# Patient Record
Sex: MALE | Race: WHITE | HISPANIC OR LATINO | Employment: FULL TIME | URBAN - METROPOLITAN AREA
[De-identification: names, ages, dates, MRNs, and addresses within clinical notes are randomized per-mention and may not be internally consistent; named-entity substitution may affect disease eponyms.]

---

## 2023-06-23 ENCOUNTER — APPOINTMENT (OUTPATIENT)
Dept: CT IMAGING | Facility: CLINIC | Age: 58
End: 2023-06-23
Attending: EMERGENCY MEDICINE
Payer: OTHER MISCELLANEOUS

## 2023-06-23 ENCOUNTER — HOSPITAL ENCOUNTER (EMERGENCY)
Facility: CLINIC | Age: 58
Discharge: HOME | End: 2023-06-23
Attending: EMERGENCY MEDICINE | Admitting: EMERGENCY MEDICINE
Payer: OTHER MISCELLANEOUS

## 2023-06-23 ENCOUNTER — APPOINTMENT (OUTPATIENT)
Dept: GENERAL RADIOLOGY | Facility: CLINIC | Age: 58
End: 2023-06-23
Attending: EMERGENCY MEDICINE
Payer: OTHER MISCELLANEOUS

## 2023-06-23 VITALS
HEART RATE: 62 BPM | BODY MASS INDEX: 31.83 KG/M2 | SYSTOLIC BLOOD PRESSURE: 110 MMHG | WEIGHT: 235 LBS | OXYGEN SATURATION: 96 % | HEIGHT: 72 IN | RESPIRATION RATE: 16 BRPM | TEMPERATURE: 97.3 F | DIASTOLIC BLOOD PRESSURE: 64 MMHG

## 2023-06-23 DIAGNOSIS — S40.012A CONTUSION OF LEFT SHOULDER, INITIAL ENCOUNTER: ICD-10-CM

## 2023-06-23 DIAGNOSIS — S22.32XA CLOSED FRACTURE OF ONE RIB OF LEFT SIDE, INITIAL ENCOUNTER: ICD-10-CM

## 2023-06-23 DIAGNOSIS — S32.009A CLOSED FRACTURE OF TRANSVERSE PROCESS OF LUMBAR VERTEBRA, INITIAL ENCOUNTER (H): ICD-10-CM

## 2023-06-23 PROCEDURE — 73030 X-RAY EXAM OF SHOULDER: CPT | Mod: LT

## 2023-06-23 PROCEDURE — 72131 CT LUMBAR SPINE W/O DYE: CPT

## 2023-06-23 PROCEDURE — 72192 CT PELVIS W/O DYE: CPT

## 2023-06-23 PROCEDURE — 70450 CT HEAD/BRAIN W/O DYE: CPT

## 2023-06-23 PROCEDURE — 250N000013 HC RX MED GY IP 250 OP 250 PS 637: Performed by: EMERGENCY MEDICINE

## 2023-06-23 PROCEDURE — 99285 EMERGENCY DEPT VISIT HI MDM: CPT | Mod: 25

## 2023-06-23 RX ORDER — OXYCODONE AND ACETAMINOPHEN 5; 325 MG/1; MG/1
2 TABLET ORAL ONCE
Status: COMPLETED | OUTPATIENT
Start: 2023-06-23 | End: 2023-06-23

## 2023-06-23 RX ORDER — CYCLOBENZAPRINE HCL 10 MG
10 TABLET ORAL ONCE
Status: COMPLETED | OUTPATIENT
Start: 2023-06-23 | End: 2023-06-23

## 2023-06-23 RX ORDER — OXYCODONE AND ACETAMINOPHEN 5; 325 MG/1; MG/1
1 TABLET ORAL EVERY 6 HOURS PRN
Qty: 12 TABLET | Refills: 0 | Status: SHIPPED | OUTPATIENT
Start: 2023-06-23 | End: 2023-06-26

## 2023-06-23 RX ORDER — CYCLOBENZAPRINE HCL 10 MG
5-10 TABLET ORAL 3 TIMES DAILY PRN
Qty: 20 TABLET | Refills: 0 | Status: SHIPPED | OUTPATIENT
Start: 2023-06-23

## 2023-06-23 RX ADMIN — CYCLOBENZAPRINE 10 MG: 10 TABLET, FILM COATED ORAL at 11:51

## 2023-06-23 RX ADMIN — OXYCODONE HYDROCHLORIDE AND ACETAMINOPHEN 2 TABLET: 5; 325 TABLET ORAL at 11:51

## 2023-06-23 ASSESSMENT — ACTIVITIES OF DAILY LIVING (ADL): ADLS_ACUITY_SCORE: 35

## 2023-06-23 NOTE — ED TRIAGE NOTES
Fall - pt at work tripped over some equipment landing on back on tire dami margot - pt complaining of lower back and left shoulder pain - pt hit head no LOC denies neck pain        Triage Assessment     Row Name 06/23/23 105       Triage Assessment (Adult)    Airway WDL WDL       Respiratory WDL    Respiratory WDL WDL       Cardiac WDL    Cardiac WDL WDL       Peripheral/Neurovascular WDL    Peripheral Neurovascular WDL WDL       Cognitive/Neuro/Behavioral WDL    Cognitive/Neuro/Behavioral WDL WDL

## 2023-06-23 NOTE — ED PROVIDER NOTES
History     Chief Complaint:  Fall       HPI   Kel Gonzales is a 57 year old male who presents to the ER for evaluation of injury sustained after fall at work.  [place] something open with a crowbar when the bar slipped and his momentum threw him backwards.  As he was falling backwards he tripped over a dami and landed on a metallic piece on the floor, striking his head on the wall.  He denies loss of consciousness.  He feels somewhat dizzy.  No vision changes or new numbness or weakness to extremities.  No chest pain or shortness of breath.  No abdominal pain.  Extremity pain is limited to the left shoulder region.  He reports limited range of motion of the left shoulder due to pain.  He also reports low back pain, worse on the left.  He denies blood thinner use.  He does report prior back surgery.  He has not taken anything for pain.        Medications:    cyclobenzaprine (FLEXERIL) 10 MG tablet  oxyCODONE-acetaminophen (PERCOCET) 5-325 MG tablet        Past Medical History:    Past Medical History:   Diagnosis Date     Hypertension        Past Surgical History:    Past Surgical History:   Procedure Laterality Date     HC CLOSED TX TRAUMATIC HIP DISLOC W/O ANESTH      left     ZZC EXPLORE ELBOW JOINT          Physical Exam     Patient Vitals for the past 24 hrs:   BP Temp Temp src Pulse Resp SpO2 Height Weight   06/23/23 1052 110/64 97.3  F (36.3  C) Temporal 62 16 96 % 1.829 m (6') 106.6 kg (235 lb)        Physical Exam  VS: Reviewed per above  HENT: Mucous membranes moist. No external signs head trauma  EYES: sclera anicteric  CV: Rate as noted,  regular rhythm.   RESP: Effort normal. Breath sounds are normal bilaterally.  GI: no tenderness/rebound/guarding, not distended.  NEURO: Alert, moving all extremities  MSK: No deformity of the extremities.  Left mid low back tenderness to palpation.  No midline C/T-spine tenderness.  SKIN: Warm and  dry    Emergency Department Course     Imaging:  CT Pelvis Bone wo Contrast   Final Result   IMPRESSION:    1. No acute findings.   2. Left hip arthroplasty.   3. Degenerative changes in the right hip, SI joints and lower lumbar   spine.   4. Benign cavernous hemangioma in L4.   5. Prostatic enlargement.      SONAL VERDE MD            SYSTEM ID:  [id number]      Head CT w/o contrast   Final Result   IMPRESSION:   1.  No intracranial hemorrhage, mass lesions, hydrocephalus or CT   evidence for an acute infarct.      JOAQUIN MACHADO MD            SYSTEM ID:  [id number]      Lumbar spine CT w/o contrast   Final Result   IMPRESSION:     1. Mildly displaced fracture of the posterior hypoplastic left 12th   rib.    2. Moderately displaced left-sided transverse process fractures from   L1-L3.       JOAQUIN MACHADO MD            SYSTEM ID:  [id number]      XR Shoulder Left G/E 3 Views   Final Result   IMPRESSION: No acute fractures are evident. Normal glenohumeral   alignment. Old healed fracture deformity of the proximal humerus with   exuberant callus formation. Moderate degenerative changes in the   glenohumeral joint. Multiple intra-articular bodies. Mild degenerative   changes in the acromioclavicular joint. Small subacromial   enthesophyte.       SONAL VERDE MD            SYSTEM ID:  [id number]         Report per radiology        Emergency Department Course & Assessments:             Interventions:  Medications   oxyCODONE-acetaminophen (PERCOCET) 5-325 MG per tablet 2 tablet (2 tablets Oral $Given 6/23/23 1151)   cyclobenzaprine (FLEXERIL) tablet 10 mg (10 mg Oral $Given 6/23/23 1151)        Disposition:  The patient was discharged to home.     Impression & Plan        Medical Decision Making:  Patient presents to the ER for evaluation of injury sustained after mechanical [place].  Vital signs reassuring.  On exam he has tenderness about the left shoulder as well as the  mid and left low back.  No other external signs of trauma appreciated.  No focal neurological deficits.  As he did report hitting his head and felt a bit dizzy, CT of the head was obtained.  This was negative for acute injury.  Left shoulder x-ray negative for fracture or dislocation.  Suspect contusion versus strain/rotator cuff injury.  CT lumbar spine and sacrum notable for left 12th rib fracture as well as L1-L3 transverse process fractures.  Discussed pain control and follow-up with spine surgery as needed.  Return precautions discussed prior to discharge.      Diagnosis:    ICD-10-CM    1. Closed fracture of one rib of left side, initial encounter  S22.32XA       2. Closed fracture of transverse process of lumbar vertebra, initial encounter (H)  S32.009A       3. Contusion of left shoulder, initial encounter  S40.012A            Discharge Medications:  Discharge Medication List as of 6/23/2023  1:56 PM      START taking these medications    Details   cyclobenzaprine (FLEXERIL) 10 MG tablet Take 0.5-1 tablets (5-10 mg) by mouth 3 times daily as needed for muscle spasms, Disp-20 tablet, R-0, E-Prescribe      oxyCODONE-acetaminophen (PERCOCET) 5-325 MG tablet Take 1 tablet by mouth every 6 hours as needed for pain, Disp-12 tablet, R-0, E-Prescribe                 Shola Iraheta MD  06/23/23 3154

## 2023-06-23 NOTE — Clinical Note
Kel Gonzales was seen and treated in our emergency department on 6/23/2023.  He may return to work on 07/10/2023.  Mr Gonzales should be on light duty and not doing heavy lifting or deep bending while at work until cleared by his primary doctor or spine surgeon.      If you have any questions or concerns, please don't hesitate to call.      Shola Iraheta MD